# Patient Record
Sex: MALE | Race: WHITE | NOT HISPANIC OR LATINO | Employment: UNEMPLOYED | RURAL
[De-identification: names, ages, dates, MRNs, and addresses within clinical notes are randomized per-mention and may not be internally consistent; named-entity substitution may affect disease eponyms.]

---

## 2017-05-27 ENCOUNTER — CONVERSION ENCOUNTER (OUTPATIENT)
Dept: FAMILY MEDICINE CLINIC | Facility: CLINIC | Age: 52
End: 2017-05-27

## 2017-05-27 LAB
ALBUMIN SERPL-MCNC: 4.2 G/DL (ref 3.6–5.1)
ALBUMIN/GLOB SERPL: ABNORMAL {RATIO} (ref 1–2.5)
ALP SERPL-CCNC: 72 UNITS/L (ref 40–115)
ALT SERPL-CCNC: 19 UNITS/L (ref 9–46)
AST SERPL-CCNC: 24 UNITS/L (ref 10–35)
BILIRUB SERPL-MCNC: 0.5 MG/DL (ref 0.2–1.2)
BUN SERPL-MCNC: 5 MG/DL (ref 7–25)
BUN/CREAT SERPL: ABNORMAL (ref 6–22)
CALCIUM SERPL-MCNC: 10.4 MG/DL (ref 8.6–10.3)
CHLORIDE SERPL-SCNC: 101 MMOL/L (ref 98–110)
CHOLEST SERPL-MCNC: 190 MG/DL (ref 125–200)
CHOLEST/HDLC SERPL: ABNORMAL {RATIO}
CO2 CONTENT VENOUS: 27 MMOL/L (ref 20–31)
CONV TOTAL PROTEIN: 7.1 G/DL (ref 6.1–8.1)
CREAT UR-MCNC: 0.68 MG/DL (ref 0.7–1.33)
GLOBULIN UR ELPH-MCNC: ABNORMAL G/DL (ref 1.9–3.7)
GLUCOSE SERPL-MCNC: 86 MG/DL (ref 65–99)
HDLC SERPL-MCNC: 91 MG/DL
LDLC SERPL CALC-MCNC: ABNORMAL MG/DL
POTASSIUM SERPL-SCNC: 4.1 MMOL/L (ref 3.5–5.3)
PSA SERPL-MCNC: 0.7 NG/ML
SODIUM SERPL-SCNC: 136 MMOL/L (ref 135–146)
TRIGL SERPL-MCNC: 121 MG/DL
TSH SERPL-ACNC: 1.49 MICROINTL UNITS/ML (ref 0.4–4.5)

## 2017-09-25 ENCOUNTER — OFFICE (AMBULATORY)
Dept: URBAN - METROPOLITAN AREA CLINIC 64 | Facility: CLINIC | Age: 52
End: 2017-09-25

## 2017-09-25 ENCOUNTER — ON CAMPUS - OUTPATIENT (AMBULATORY)
Dept: URBAN - METROPOLITAN AREA HOSPITAL 2 | Facility: HOSPITAL | Age: 52
End: 2017-09-25

## 2017-09-25 VITALS
DIASTOLIC BLOOD PRESSURE: 69 MMHG | OXYGEN SATURATION: 97 % | SYSTOLIC BLOOD PRESSURE: 116 MMHG | TEMPERATURE: 98.8 F | SYSTOLIC BLOOD PRESSURE: 111 MMHG | HEART RATE: 94 BPM | HEART RATE: 79 BPM | SYSTOLIC BLOOD PRESSURE: 155 MMHG | SYSTOLIC BLOOD PRESSURE: 110 MMHG | DIASTOLIC BLOOD PRESSURE: 54 MMHG | OXYGEN SATURATION: 95 % | HEART RATE: 98 BPM | RESPIRATION RATE: 14 BRPM | SYSTOLIC BLOOD PRESSURE: 108 MMHG | SYSTOLIC BLOOD PRESSURE: 102 MMHG | SYSTOLIC BLOOD PRESSURE: 101 MMHG | OXYGEN SATURATION: 98 % | HEART RATE: 82 BPM | HEART RATE: 77 BPM | DIASTOLIC BLOOD PRESSURE: 67 MMHG | DIASTOLIC BLOOD PRESSURE: 71 MMHG | RESPIRATION RATE: 19 BRPM | HEART RATE: 95 BPM | HEART RATE: 73 BPM | DIASTOLIC BLOOD PRESSURE: 99 MMHG | WEIGHT: 145 LBS | SYSTOLIC BLOOD PRESSURE: 135 MMHG | RESPIRATION RATE: 16 BRPM | DIASTOLIC BLOOD PRESSURE: 73 MMHG | RESPIRATION RATE: 15 BRPM | DIASTOLIC BLOOD PRESSURE: 72 MMHG | HEIGHT: 68 IN | HEART RATE: 93 BPM | DIASTOLIC BLOOD PRESSURE: 62 MMHG | HEART RATE: 96 BPM | DIASTOLIC BLOOD PRESSURE: 93 MMHG | OXYGEN SATURATION: 99 % | SYSTOLIC BLOOD PRESSURE: 151 MMHG

## 2017-09-25 DIAGNOSIS — Z12.11 ENCOUNTER FOR SCREENING FOR MALIGNANT NEOPLASM OF COLON: ICD-10-CM

## 2017-09-25 DIAGNOSIS — Z80.0 FAMILY HISTORY OF MALIGNANT NEOPLASM OF DIGESTIVE ORGANS: ICD-10-CM

## 2017-09-25 DIAGNOSIS — D12.5 BENIGN NEOPLASM OF SIGMOID COLON: ICD-10-CM

## 2017-09-25 LAB
GI HISTOLOGY: A. UNSPECIFIED: (no result)
GI HISTOLOGY: PDF REPORT: (no result)

## 2017-09-25 PROCEDURE — 88305 TISSUE EXAM BY PATHOLOGIST: CPT | Performed by: INTERNAL MEDICINE

## 2017-09-25 PROCEDURE — 45385 COLONOSCOPY W/LESION REMOVAL: CPT | Mod: 33 | Performed by: INTERNAL MEDICINE

## 2017-09-25 RX ADMIN — PROPOFOL: 10 INJECTION, EMULSION INTRAVENOUS at 09:07

## 2017-09-25 NOTE — SERVICEHPINOTES
DAVID SCANLON  is a  52  male   who presents today for a  Colonoscopy   for   the indications listed below. The updated Patient Profile was reviewed prior to the procedure, in conjunction with the Physical Exam, including medical conditions, surgical procedures, medications, allergies, family history and social history. See Physical Exam time stamp below for date and time of HPI completion.Pre-operatively, I reviewed the indication(s) for the procedure, the risks of the procedure [including but not limited to: unexpected bleeding possibly requiring hospitalization and/or unplanned repeat procedures, perforation possibly requiring surgical treatment, missed lesions and complications of sedation/MAC (also explained by anesthesia staff)]. I have evaluated the patient for risks associated with the planned anesthesia and the procedure to be performed and find the patient an acceptable candidate for IV sedation.Multiple opportunities were provided for any questions or concerns, and all questions were answered satisfactorily before any anesthesia was administered. We will proceed with the planned procedure.

## 2017-10-02 ENCOUNTER — HOSPITAL ENCOUNTER (OUTPATIENT)
Dept: OTHER | Facility: HOSPITAL | Age: 52
Discharge: HOME | End: 2017-10-02
Attending: FAMILY MEDICINE | Admitting: FAMILY MEDICINE

## 2018-10-18 LAB
ALBUMIN SERPL-MCNC: 4.3 G/DL (ref 3.6–5.1)
ALBUMIN/GLOB SERPL: ABNORMAL {RATIO} (ref 1–2.5)
ALP SERPL-CCNC: 94 UNITS/L (ref 40–115)
ALT SERPL-CCNC: 34 UNITS/L (ref 9–46)
AST SERPL-CCNC: 49 UNITS/L (ref 10–35)
BASOPHILS # BLD AUTO: ABNORMAL 10*3/MM3 (ref 0–200)
BASOPHILS NFR BLD AUTO: 1.2 %
BILIRUB SERPL-MCNC: 0.7 MG/DL (ref 0.2–1.2)
BUN SERPL-MCNC: 5 MG/DL (ref 7–25)
BUN/CREAT SERPL: ABNORMAL (ref 6–22)
CALCIUM SERPL-MCNC: 11.1 MG/DL (ref 8.6–10.3)
CHLORIDE SERPL-SCNC: 101 MMOL/L (ref 98–110)
CHOLEST SERPL-MCNC: 223 MG/DL
CHOLEST/HDLC SERPL: ABNORMAL {RATIO}
CO2 CONTENT VENOUS: 25 MMOL/L (ref 20–32)
CONV NEUTROPHILS/100 LEUKOCYTES IN BODY FLUID BY MANUAL COUNT: 37.6 %
CONV TOTAL PROTEIN: 7.6 G/DL (ref 6.1–8.1)
CREAT UR-MCNC: 0.77 MG/DL (ref 0.7–1.33)
EOSINOPHIL # BLD AUTO: 7.8 %
EOSINOPHIL # BLD AUTO: ABNORMAL 10*3/MM3 (ref 15–500)
ERYTHROCYTE [DISTWIDTH] IN BLOOD BY AUTOMATED COUNT: 11.7 % (ref 11–15)
GLOBULIN UR ELPH-MCNC: ABNORMAL G/DL (ref 1.9–3.7)
GLUCOSE SERPL-MCNC: 90 MG/DL (ref 65–99)
HCT VFR BLD AUTO: 43.9 % (ref 38.5–50)
HDLC SERPL-MCNC: 119 MG/DL
HGB BLD-MCNC: 15.4 G/DL (ref 13.2–17.1)
LDLC SERPL CALC-MCNC: ABNORMAL MG/DL
LYMPHOCYTES # BLD AUTO: ABNORMAL 10*3/MM3 (ref 850–3900)
LYMPHOCYTES NFR BLD AUTO: 37.6 %
MCH RBC QN AUTO: 31.9 PG (ref 27–33)
MCHC RBC AUTO-ENTMCNC: ABNORMAL % (ref 32–36)
MCV RBC AUTO: 90.9 FL (ref 80–100)
MONOCYTES # BLD AUTO: ABNORMAL 10*3/MICROLITER (ref 200–950)
MONOCYTES NFR BLD AUTO: 15.8 %
NEUTROPHILS # BLD AUTO: ABNORMAL 10*3/MM3 (ref 1500–7800)
PLATELET # BLD AUTO: ABNORMAL 10*3/MM3 (ref 140–400)
PMV BLD AUTO: 10.2 FL (ref 7.5–12.5)
POTASSIUM SERPL-SCNC: 4.3 MMOL/L (ref 3.5–5.3)
PSA SERPL-MCNC: 1.1 NG/ML
RBC # BLD AUTO: ABNORMAL 10*6/MM3 (ref 4.2–5.8)
SODIUM SERPL-SCNC: 136 MMOL/L (ref 135–146)
TRIGL SERPL-MCNC: 66 MG/DL
WBC # BLD AUTO: ABNORMAL K/UL (ref 3.8–10.8)

## 2019-04-18 LAB
ALBUMIN SERPL-MCNC: 3.9 G/DL (ref 3.6–5.1)
ALBUMIN/GLOB SERPL: ABNORMAL {RATIO} (ref 1–2.5)
ALP SERPL-CCNC: 102 UNITS/L (ref 40–115)
ALT SERPL-CCNC: 66 UNITS/L (ref 9–46)
AST SERPL-CCNC: 79 UNITS/L (ref 10–35)
BILIRUB SERPL-MCNC: 0.6 MG/DL (ref 0.2–1.2)
BUN SERPL-MCNC: 4 MG/DL (ref 7–25)
BUN/CREAT SERPL: ABNORMAL (ref 6–22)
CALCIUM SERPL-MCNC: 10 MG/DL (ref 8.6–10.3)
CHLORIDE SERPL-SCNC: 100 MMOL/L (ref 98–110)
CO2 CONTENT VENOUS: 27 MMOL/L (ref 20–32)
CONV TOTAL PROTEIN: 6.8 G/DL (ref 6.1–8.1)
CREAT UR-MCNC: 0.71 MG/DL (ref 0.7–1.33)
GLOBULIN UR ELPH-MCNC: ABNORMAL G/DL (ref 1.9–3.7)
GLUCOSE SERPL-MCNC: 124 MG/DL (ref 65–99)
POTASSIUM SERPL-SCNC: 3.6 MMOL/L (ref 3.5–5.3)
SODIUM SERPL-SCNC: 138 MMOL/L (ref 135–146)

## 2019-05-23 ENCOUNTER — CONVERSION ENCOUNTER (OUTPATIENT)
Dept: FAMILY MEDICINE CLINIC | Facility: CLINIC | Age: 54
End: 2019-05-23

## 2019-05-23 LAB
ALBUMIN SERPL-MCNC: 3.9 G/DL (ref 3.6–5.1)
ALBUMIN/GLOB SERPL: ABNORMAL {RATIO} (ref 1–2.5)
ALP SERPL-CCNC: 94 UNITS/L (ref 40–115)
ALT SERPL-CCNC: 58 UNITS/L (ref 9–46)
AST SERPL-CCNC: 74 UNITS/L (ref 10–35)
BILIRUB SERPL-MCNC: 0.8 MG/DL (ref 0.2–1.2)
BUN SERPL-MCNC: 5 MG/DL (ref 7–25)
BUN/CREAT SERPL: ABNORMAL (ref 6–22)
CALCIUM SERPL-MCNC: 10.4 MG/DL (ref 8.6–10.3)
CHLORIDE SERPL-SCNC: 102 MMOL/L (ref 98–110)
CO2 CONTENT VENOUS: 27 MMOL/L (ref 20–32)
CONV TOTAL PROTEIN: 6.9 G/DL (ref 6.1–8.1)
CREAT UR-MCNC: 0.66 MG/DL (ref 0.7–1.33)
GLOBULIN UR ELPH-MCNC: ABNORMAL G/DL (ref 1.9–3.7)
GLUCOSE SERPL-MCNC: 78 MG/DL (ref 65–99)
POTASSIUM SERPL-SCNC: 4.1 MMOL/L (ref 3.5–5.3)
SODIUM SERPL-SCNC: 138 MMOL/L (ref 135–146)

## 2019-07-31 RX ORDER — AMLODIPINE BESYLATE 10 MG/1
TABLET ORAL
Qty: 90 TABLET | Refills: 3 | Status: SHIPPED | OUTPATIENT
Start: 2019-07-31 | End: 2020-08-20

## 2019-08-29 ENCOUNTER — HOSPITAL ENCOUNTER (OUTPATIENT)
Dept: GENERAL RADIOLOGY | Facility: HOSPITAL | Age: 54
Discharge: HOME | End: 2019-08-29
Admitting: FAMILY MEDICINE

## 2019-08-29 ENCOUNTER — OFFICE VISIT (OUTPATIENT)
Dept: FAMILY MEDICINE CLINIC | Facility: CLINIC | Age: 54
End: 2019-08-29

## 2019-08-29 VITALS
BODY MASS INDEX: 21.07 KG/M2 | TEMPERATURE: 98.5 F | RESPIRATION RATE: 16 BRPM | WEIGHT: 139 LBS | HEIGHT: 68 IN | SYSTOLIC BLOOD PRESSURE: 108 MMHG | HEART RATE: 80 BPM | DIASTOLIC BLOOD PRESSURE: 73 MMHG | OXYGEN SATURATION: 98 %

## 2019-08-29 DIAGNOSIS — R19.7 VOMITING AND DIARRHEA: ICD-10-CM

## 2019-08-29 DIAGNOSIS — R11.10 VOMITING AND DIARRHEA: ICD-10-CM

## 2019-08-29 DIAGNOSIS — R19.7 VOMITING AND DIARRHEA: Primary | ICD-10-CM

## 2019-08-29 DIAGNOSIS — R91.1 PULMONARY NODULE: ICD-10-CM

## 2019-08-29 DIAGNOSIS — R11.10 VOMITING AND DIARRHEA: Primary | ICD-10-CM

## 2019-08-29 PROBLEM — M54.9 BACK PAIN: Status: ACTIVE | Noted: 2017-07-07

## 2019-08-29 PROBLEM — R74.8 ELEVATED LIVER ENZYMES: Status: ACTIVE | Noted: 2019-04-17

## 2019-08-29 PROBLEM — D12.6 ADENOMATOUS POLYP OF COLON: Status: ACTIVE | Noted: 2017-09-26

## 2019-08-29 PROBLEM — K43.9 HERNIA OF ANTERIOR ABDOMINAL WALL: Status: ACTIVE | Noted: 2017-07-07

## 2019-08-29 PROBLEM — B35.3 TINEA PEDIS: Status: ACTIVE | Noted: 2018-10-18

## 2019-08-29 PROBLEM — L03.213 PERIORBITAL CELLULITIS OF LEFT EYE: Status: ACTIVE | Noted: 2018-11-28

## 2019-08-29 PROBLEM — R07.89 CHEST WALL PAIN: Status: ACTIVE | Noted: 2017-10-02

## 2019-08-29 PROCEDURE — 99213 OFFICE O/P EST LOW 20 MIN: CPT | Performed by: FAMILY MEDICINE

## 2019-08-29 PROCEDURE — 74022 RADEX COMPL AQT ABD SERIES: CPT

## 2019-08-29 RX ORDER — TERBINAFINE HYDROCHLORIDE 250 MG/1
250 TABLET ORAL DAILY
COMMUNITY
Start: 2018-10-18 | End: 2021-08-11

## 2019-08-29 RX ORDER — NAFTIFINE HYDROCHLORIDE 20 MG/G
CREAM TOPICAL
COMMUNITY
Start: 2018-10-18 | End: 2021-08-11

## 2019-08-29 RX ORDER — ONDANSETRON 4 MG/1
TABLET, FILM COATED ORAL
Refills: 0 | COMMUNITY
Start: 2019-08-27 | End: 2019-09-05 | Stop reason: SDUPTHER

## 2019-08-29 NOTE — PROGRESS NOTES
He states he is able to keep down some liquids but has persistent symptoms for the last 5 days.      Vomiting    This is a new problem. The current episode started in the past 7 days. The problem occurs 5 to 10 times per day. The problem has been unchanged. The emesis has an appearance of bile and stomach contents. There has been no fever. Associated symptoms include abdominal pain, chills, diarrhea, headaches, myalgias and sweats. Pertinent negatives include no arthralgias, chest pain, coughing, dizziness, fever or URI. He has tried sleep and increased fluids for the symptoms. The treatment provided no relief.     Past Medical History:   Diagnosis Date   • Anxiety    • Asthma    • Elevated liver enzymes    • Family history of malignant neoplasm of gastrointestinal tract    • Goiter, non-toxic    • Hernia of anterior abdominal wall    • Hx of adenomatous colonic polyps    • Hyperlipidemia    • Hypertension    • Impotence, organic    • Mitral valve insufficiency    • Osteoporosis    • Periorbital cellulitis    • Primary hyperparathyroidism (CMS/HCC)    • Pulmonary nodules    • Renal cyst    • Ventral hernia    • Vitamin D deficiency      Past Surgical History:   Procedure Laterality Date   • COLONOSCOPY     • OTHER SURGICAL HISTORY      right talus fracture repair   • OTHER SURGICAL HISTORY      l thyroid nodule biopsy   • OTHER SURGICAL HISTORY      left thyroid lobectomy , bilateral parathyroid exploration      Family History   Problem Relation Age of Onset   • Breast cancer Mother    • Colon cancer Father    • Hypertension Paternal Grandfather      Social History     Tobacco Use   • Smoking status: Never Smoker   • Smokeless tobacco: Never Used   Substance Use Topics   • Alcohol use: No     Frequency: Never     PHQ-2 Depression Screening  Little interest or pleasure in doing things? 0   Feeling down, depressed, or hopeless? 0   PHQ-2 Total Score 0     PHQ-9 Depression Screening  Little interest or pleasure in  "doing things? 0   Feeling down, depressed, or hopeless? 0   Trouble falling or staying asleep, or sleeping too much?     Feeling tired or having little energy?     Poor appetite or overeating?     Feeling bad about yourself - or that you are a failure or have let yourself or your family down?     Trouble concentrating on things, such as reading the newspaper or watching television?     Moving or speaking so slowly that other people could have noticed? Or the opposite - being so fidgety or restless that you have been moving around a lot more than usual?     Thoughts that you would be better off dead, or of hurting yourself in some way?     PHQ-9 Total Score 0   If you checked off any problems, how difficult have these problems made it for you to do your work, take care of things at home, or get along with other people?         Review of Systems   Constitutional: Positive for chills. Negative for fatigue and fever.   Respiratory: Negative for cough and shortness of breath.    Cardiovascular: Negative for chest pain and palpitations.   Gastrointestinal: Positive for abdominal pain, diarrhea, nausea, vomiting, GERD and indigestion.   Genitourinary: Positive for decreased urine volume.   Musculoskeletal: Positive for myalgias. Negative for arthralgias and back pain.   Skin: Negative for rash.   Neurological: Negative for dizziness and headache.     Vitals:    08/29/19 0911   BP: 108/73   BP Location: Right arm   Patient Position: Sitting   Cuff Size: Adult   Pulse: 80   Resp: 16   Temp: 98.5 °F (36.9 °C)   TempSrc: Oral   SpO2: 98%   Weight: 63 kg (139 lb)   Height: 172.7 cm (68\")     Body mass index is 21.13 kg/m².  Physical Exam   Constitutional: He is oriented to person, place, and time. He appears well-developed and well-nourished. No distress.   Cardiovascular: Normal rate, regular rhythm and normal heart sounds.   No murmur heard.  Pulmonary/Chest: Effort normal and breath sounds normal. He has no wheezes. He has " no rales.   Abdominal: Soft. Bowel sounds are normal. He exhibits no distension.   Musculoskeletal: Normal range of motion.   Neurological: He is alert and oriented to person, place, and time.   Skin: Skin is warm and dry.   Psychiatric: He has a normal mood and affect. His behavior is normal.     No visits with results within 7 Day(s) from this visit.   Latest known visit with results is:   Conversion Encounter on 05/23/2019   Component Date Value Ref Range Status   • Albumin 05/23/2019 3.9  3.6 - 5.1 g/dL Final   • Alkaline Phosphatase 05/23/2019 94  40 - 115 units/L Final   • Total Bilirubin 05/23/2019 0.8  0.2 - 1.2 mg/dL Final   • BUN 05/23/2019 5* 7 - 25 mg/dL Final   • BUN/Creatinine Ratio 05/23/2019 8 (calc)  6 - 22 Final   • Calcium 05/23/2019 10.4* 8.6 - 10.3 mg/dL Final   • Chloride 05/23/2019 102  98 - 110 mmol/L Final   • CO2 CONTENT  05/23/2019 27  20 - 32 mmol/L Final   • Creatinine 05/23/2019 0.66* 0.70 - 1.33 mg/dL Final   • eGFR African Am 05/23/2019 109  > OR = 60 mL/min/1.73m2 Final   • eGFR Non  Am 05/23/2019 126  > OR = 60 mL/min/1.73m2 Final   • Globulin 05/23/2019 3.0 G/DL (CALC)  1.9 - 3.7 g/dL Final   • Glucose 05/23/2019 78  65 - 99 mg/dL Final   • Potassium 05/23/2019 4.1  3.5 - 5.3 mmol/L Final   • Total Protein 05/23/2019 6.9  6.1 - 8.1 g/dL Final   • AST (SGOT) 05/23/2019 74* 10 - 35 units/L Final   • ALT (SGPT) 05/23/2019 58* 9 - 46 units/L Final   • Sodium 05/23/2019 138  135 - 146 mmol/L Final   • A/G Ratio 05/23/2019 1.3 (calc)  1.0 - 2.5 Final         Diagnoses and all orders for this visit:    1. Vomiting and diarrhea (Primary)  Assessment & Plan:  Most likely from viral gastroneuritis of the could be from food poisoning.  His wife now has similar symptoms.  He has not had a fever.  He denies blood or mucus in stools.  We will continue force fluids and continue ondansetron as needed.  If feeling symptoms in the next 3 days we will check blood work and repeat imaging.  He  does have a non-specific bowel gas pattern noted on x-ray. Increase fluids. Avoid milk products and caffeine. Clear liquids until better.       Orders:  -     XR Abdomen 2 View With Chest 1 View; Future    2. Pulmonary nodule  Assessment & Plan:  Noted on x-ray.  We will check a CT of the chest

## 2019-08-29 NOTE — ASSESSMENT & PLAN NOTE
Most likely from viral gastroneuritis of the could be from food poisoning.  His wife now has similar symptoms.  He has not had a fever.  He denies blood or mucus in stools.  We will continue force fluids and continue ondansetron as needed.  If feeling symptoms in the next 3 days we will check blood work and repeat imaging.  He does have a non-specific bowel gas pattern noted on x-ray. Increase fluids. Avoid milk products and caffeine. Clear liquids until better.

## 2019-08-30 ENCOUNTER — TELEPHONE (OUTPATIENT)
Dept: FAMILY MEDICINE CLINIC | Facility: CLINIC | Age: 54
End: 2019-08-30

## 2019-08-30 DIAGNOSIS — R91.1 PULMONARY NODULE: Primary | ICD-10-CM

## 2019-08-30 NOTE — TELEPHONE ENCOUNTER
----- Message from Reggie Duane Lyell, MD sent at 8/29/2019  1:41 PM EDT -----  Please set up for CT chest with contrast to evaluate pulmonary nodule

## 2019-09-05 ENCOUNTER — TELEPHONE (OUTPATIENT)
Dept: FAMILY MEDICINE CLINIC | Facility: CLINIC | Age: 54
End: 2019-09-05

## 2019-09-05 RX ORDER — ONDANSETRON 4 MG/1
4 TABLET, FILM COATED ORAL EVERY 8 HOURS PRN
Qty: 30 TABLET | Refills: 0 | Status: SHIPPED | OUTPATIENT
Start: 2019-09-05 | End: 2019-10-17 | Stop reason: SDUPTHER

## 2019-09-05 NOTE — TELEPHONE ENCOUNTER
The patient is still sick and you said that you offered him zofran when he was in here but he didn't take it.  He wants to know if you will send that in to [facility] for him. Please advise

## 2019-09-13 ENCOUNTER — HOSPITAL ENCOUNTER (OUTPATIENT)
Dept: CT IMAGING | Facility: HOSPITAL | Age: 54
Discharge: HOME | End: 2019-09-13
Admitting: FAMILY MEDICINE

## 2019-09-13 DIAGNOSIS — R91.1 PULMONARY NODULE: ICD-10-CM

## 2019-09-13 LAB — CREAT BLDA-MCNC: 0.6 MG/DL (ref 0.6–1.3)

## 2019-09-13 PROCEDURE — 82565 ASSAY OF CREATININE: CPT

## 2019-09-13 PROCEDURE — 0 IOPAMIDOL PER 1 ML: Performed by: FAMILY MEDICINE

## 2019-09-13 PROCEDURE — 71260 CT THORAX DX C+: CPT

## 2019-09-13 RX ADMIN — IOPAMIDOL 100 ML: 755 INJECTION, SOLUTION INTRAVENOUS at 17:15

## 2019-10-14 DIAGNOSIS — R06.02 SHORTNESS OF BREATH: Primary | ICD-10-CM

## 2019-10-17 ENCOUNTER — OFFICE VISIT (OUTPATIENT)
Dept: FAMILY MEDICINE CLINIC | Facility: CLINIC | Age: 54
End: 2019-10-17

## 2019-10-17 VITALS
BODY MASS INDEX: 21.19 KG/M2 | WEIGHT: 139.8 LBS | RESPIRATION RATE: 18 BRPM | OXYGEN SATURATION: 98 % | HEART RATE: 77 BPM | TEMPERATURE: 97.9 F | HEIGHT: 68 IN | DIASTOLIC BLOOD PRESSURE: 69 MMHG | SYSTOLIC BLOOD PRESSURE: 109 MMHG

## 2019-10-17 DIAGNOSIS — Z23 NEED FOR VACCINATION FOR H FLU TYPE B: ICD-10-CM

## 2019-10-17 DIAGNOSIS — R11.10 VOMITING AND DIARRHEA: Primary | ICD-10-CM

## 2019-10-17 DIAGNOSIS — R19.7 VOMITING AND DIARRHEA: Primary | ICD-10-CM

## 2019-10-17 DIAGNOSIS — R10.84 GENERALIZED ABDOMINAL PAIN: ICD-10-CM

## 2019-10-17 PROCEDURE — 99213 OFFICE O/P EST LOW 20 MIN: CPT | Performed by: FAMILY MEDICINE

## 2019-10-17 PROCEDURE — 90686 IIV4 VACC NO PRSV 0.5 ML IM: CPT | Performed by: FAMILY MEDICINE

## 2019-10-17 PROCEDURE — 90471 IMMUNIZATION ADMIN: CPT | Performed by: FAMILY MEDICINE

## 2019-10-17 RX ORDER — ONDANSETRON 4 MG/1
4 TABLET, FILM COATED ORAL EVERY 8 HOURS PRN
Qty: 30 TABLET | Refills: 0 | Status: SHIPPED | OUTPATIENT
Start: 2019-10-17 | End: 2021-08-11

## 2019-10-17 NOTE — PROGRESS NOTES
Patient returns for follow-up of continued abdominal pain associated with intermittent diarrhea.  He had a colonoscopy 1 year ago.  This was unremarkable.  He had been seen in the emergency room with imaging studies none.  He has been treated empirically with no significant improvement.  He continues to have nausea and lower abdominal pain.  Caffeine and alcohol seem to exacerbate symptoms.      Past Medical History:   Diagnosis Date   • Anxiety    • Asthma    • Elevated liver enzymes    • Family history of malignant neoplasm of gastrointestinal tract    • Goiter, non-toxic    • Hernia of anterior abdominal wall    • Hx of adenomatous colonic polyps    • Hyperlipidemia    • Hypertension    • Impotence, organic    • Mitral valve insufficiency    • Osteoporosis    • Periorbital cellulitis    • Primary hyperparathyroidism (CMS/HCC)    • Pulmonary nodules    • Renal cyst    • Ventral hernia    • Vitamin D deficiency      Past Surgical History:   Procedure Laterality Date   • COLONOSCOPY     • OTHER SURGICAL HISTORY      right talus fracture repair   • OTHER SURGICAL HISTORY      l thyroid nodule biopsy   • OTHER SURGICAL HISTORY      left thyroid lobectomy , bilateral parathyroid exploration      Family History   Problem Relation Age of Onset   • Breast cancer Mother    • Colon cancer Father    • Hypertension Paternal Grandfather      Social History     Tobacco Use   • Smoking status: Never Smoker   • Smokeless tobacco: Never Used   Substance Use Topics   • Alcohol use: No     Frequency: Never     Review of Systems   Constitutional: Negative for chills and fatigue.   Respiratory: Negative for cough and shortness of breath.    Cardiovascular: Negative for chest pain and palpitations.   Gastrointestinal:        See HPI   Musculoskeletal: Negative for arthralgias, back pain and myalgias.   Skin: Negative for rash.   Neurological: Negative for dizziness and headache.     Vitals:    10/17/19 1305   BP: 109/69   BP Location:  "Left arm   Cuff Size: Adult   Pulse: 77   Resp: 18   Temp: 97.9 °F (36.6 °C)   TempSrc: Oral   SpO2: 98%   Weight: 63.4 kg (139 lb 12.8 oz)   Height: 172.7 cm (68\")     Body mass index is 21.26 kg/m².  Physical Exam   Constitutional: He is oriented to person, place, and time. He appears well-developed and well-nourished. No distress.   Cardiovascular: Normal rate, regular rhythm and normal heart sounds.   No murmur heard.  Pulmonary/Chest: Effort normal and breath sounds normal. He has no wheezes. He has no rales.   Abdominal: Soft. Bowel sounds are normal. He exhibits no distension.   Musculoskeletal: Normal range of motion.   Neurological: He is alert and oriented to person, place, and time.   Skin: Skin is warm and dry.   Psychiatric: He has a normal mood and affect. His behavior is normal.     No visits with results within 7 Day(s) from this visit.   Latest known visit with results is:   Hospital Outpatient Visit on 09/13/2019   Component Date Value Ref Range Status   • Creatinine 09/13/2019 0.60  0.60 - 1.30 mg/dL Final    Serial Number: [id number]:  [id number]   • GFR MDRD  09/13/2019    Final    Serial Number: [id number]:  [id number]   • GFR MDRD Non  09/13/2019    Final    Serial Number: [id number]:  [id number]         Diagnoses and all orders for this visit:    1. Vomiting and diarrhea (Primary)  Assessment & Plan:  Uncertain etiology.  Will get a gastrology consult to see if we are dealing with a functional issue, infectious issue, or something else.  He has had normal blood work and stool studies.  Going GI recognitions. Will increase Lomotil to every 6 hours as needed.    Orders:  -     Ambulatory Referral to Gastroenterology    2. Generalized abdominal pain  -     Ambulatory Referral to Gastroenterology    3. Need for vaccination for H flu type B  -     Fluarix/Fluzone/Afluria Quad>6 Months    Other orders  -     ondansetron (ZOFRAN) 4 MG tablet; Take 1 tablet by mouth " Every 8 (Eight) Hours As Needed for Nausea or Vomiting.  Dispense: 30 tablet; Refill: 0

## 2019-10-18 NOTE — ASSESSMENT & PLAN NOTE
Uncertain etiology.  Will get a gastrology consult to see if we are dealing with a functional issue, infectious issue, or something else.  He has had normal blood work and stool studies.  Going GI recognitions. Will increase Lomotil to every 6 hours as needed.

## 2019-11-25 ENCOUNTER — OFFICE (AMBULATORY)
Dept: URBAN - METROPOLITAN AREA CLINIC 64 | Facility: CLINIC | Age: 54
End: 2019-11-25

## 2019-11-25 VITALS
HEIGHT: 68 IN | HEART RATE: 102 BPM | SYSTOLIC BLOOD PRESSURE: 124 MMHG | WEIGHT: 140 LBS | DIASTOLIC BLOOD PRESSURE: 77 MMHG

## 2019-11-25 DIAGNOSIS — R19.5 OTHER FECAL ABNORMALITIES: ICD-10-CM

## 2019-11-25 DIAGNOSIS — K21.9 GASTRO-ESOPHAGEAL REFLUX DISEASE WITHOUT ESOPHAGITIS: ICD-10-CM

## 2019-11-25 DIAGNOSIS — I10 ESSENTIAL (PRIMARY) HYPERTENSION: ICD-10-CM

## 2019-11-25 DIAGNOSIS — F41.9 ANXIETY DISORDER, UNSPECIFIED: ICD-10-CM

## 2019-11-25 DIAGNOSIS — R63.4 ABNORMAL WEIGHT LOSS: ICD-10-CM

## 2019-11-25 DIAGNOSIS — R10.33 PERIUMBILICAL PAIN: ICD-10-CM

## 2019-11-25 DIAGNOSIS — R19.7 DIARRHEA, UNSPECIFIED: ICD-10-CM

## 2019-11-25 DIAGNOSIS — R15.2 FECAL URGENCY: ICD-10-CM

## 2019-11-25 DIAGNOSIS — R11.2 NAUSEA WITH VOMITING, UNSPECIFIED: ICD-10-CM

## 2019-11-25 PROCEDURE — 99214 OFFICE O/P EST MOD 30 MIN: CPT | Performed by: NURSE PRACTITIONER

## 2019-11-25 RX ORDER — ONDANSETRON HYDROCHLORIDE 4 MG/1
16 TABLET, FILM COATED ORAL
Qty: 40 | Refills: 0 | Status: COMPLETED
Start: 2019-11-25 | End: 2021-06-09

## 2019-11-25 RX ORDER — OMEPRAZOLE 40 MG/1
40 CAPSULE, DELAYED RELEASE ORAL
Qty: 30 | Refills: 11 | Status: COMPLETED
Start: 2019-11-25 | End: 2020-01-27

## 2019-11-25 RX ORDER — DICYCLOMINE HYDROCHLORIDE 20 MG/1
80 TABLET ORAL
Qty: 120 | Refills: 12 | Status: ACTIVE
Start: 2019-11-25

## 2019-12-30 ENCOUNTER — ANESTHESIA EVENT (OUTPATIENT)
Dept: GASTROENTEROLOGY | Facility: HOSPITAL | Age: 54
End: 2019-12-30

## 2019-12-30 ENCOUNTER — TELEPHONE (OUTPATIENT)
Dept: FAMILY MEDICINE CLINIC | Facility: CLINIC | Age: 54
End: 2019-12-30

## 2019-12-30 DIAGNOSIS — R91.1 PULMONARY NODULE: Primary | ICD-10-CM

## 2019-12-30 NOTE — TELEPHONE ENCOUNTER
I did not have a tickler for this, I have ordered and notified patient that I would get it precerted through insurance and they will call him downstairs to schedule.

## 2019-12-30 NOTE — TELEPHONE ENCOUNTER
----- Message from Reggie Duane Lyell, MD sent at 12/30/2019  7:42 AM EST -----  Did we ever get this set up?  ----- Message -----  From: Lyell, Reggie Duane, MD  Sent: 9/16/2019   5:24 AM EST  To: Reggie Duane Lyell, MD    Repeat CT of chest

## 2019-12-31 ENCOUNTER — ON CAMPUS - OUTPATIENT (AMBULATORY)
Dept: URBAN - METROPOLITAN AREA HOSPITAL 85 | Facility: HOSPITAL | Age: 54
End: 2019-12-31

## 2019-12-31 ENCOUNTER — ANESTHESIA (OUTPATIENT)
Dept: GASTROENTEROLOGY | Facility: HOSPITAL | Age: 54
End: 2019-12-31

## 2019-12-31 ENCOUNTER — HOSPITAL ENCOUNTER (OUTPATIENT)
Facility: HOSPITAL | Age: 54
Setting detail: HOSPITAL OUTPATIENT SURGERY
Discharge: HOME | End: 2019-12-31
Attending: INTERNAL MEDICINE | Admitting: INTERNAL MEDICINE

## 2019-12-31 VITALS
DIASTOLIC BLOOD PRESSURE: 77 MMHG | HEIGHT: 68 IN | SYSTOLIC BLOOD PRESSURE: 107 MMHG | OXYGEN SATURATION: 100 % | HEART RATE: 71 BPM | RESPIRATION RATE: 11 BRPM | WEIGHT: 138 LBS | BODY MASS INDEX: 20.92 KG/M2

## 2019-12-31 DIAGNOSIS — R19.7 DIARRHEA, UNSPECIFIED: ICD-10-CM

## 2019-12-31 DIAGNOSIS — R10.33 PERIUMBILICAL PAIN: ICD-10-CM

## 2019-12-31 DIAGNOSIS — R11.2 NAUSEA WITH VOMITING, UNSPECIFIED: ICD-10-CM

## 2019-12-31 DIAGNOSIS — R19.5 ABNORMAL FECES: ICD-10-CM

## 2019-12-31 DIAGNOSIS — R19.7 DIARRHEA: ICD-10-CM

## 2019-12-31 DIAGNOSIS — R10.33 ABDOMINAL PAIN, PERIUMBILICAL: ICD-10-CM

## 2019-12-31 DIAGNOSIS — K21.0 GASTRO-ESOPHAGEAL REFLUX DISEASE WITH ESOPHAGITIS: ICD-10-CM

## 2019-12-31 DIAGNOSIS — R11.2 NAUSEA WITH VOMITING: ICD-10-CM

## 2019-12-31 DIAGNOSIS — K29.60 OTHER GASTRITIS WITHOUT BLEEDING: ICD-10-CM

## 2019-12-31 DIAGNOSIS — K21.9 GASTROESOPHAGEAL REFLUX DISEASE: ICD-10-CM

## 2019-12-31 DIAGNOSIS — R19.5 OTHER FECAL ABNORMALITIES: ICD-10-CM

## 2019-12-31 DIAGNOSIS — R15.2 FECAL URGENCY: ICD-10-CM

## 2019-12-31 PROCEDURE — 43239 EGD BIOPSY SINGLE/MULTIPLE: CPT | Performed by: INTERNAL MEDICINE

## 2019-12-31 PROCEDURE — 88342 IMHCHEM/IMCYTCHM 1ST ANTB: CPT | Performed by: INTERNAL MEDICINE

## 2019-12-31 PROCEDURE — 25010000002 PROPOFOL 10 MG/ML EMULSION: Performed by: ANESTHESIOLOGY

## 2019-12-31 PROCEDURE — 88305 TISSUE EXAM BY PATHOLOGIST: CPT | Performed by: INTERNAL MEDICINE

## 2019-12-31 RX ORDER — PROPOFOL 10 MG/ML
VIAL (ML) INTRAVENOUS AS NEEDED
Status: DISCONTINUED | OUTPATIENT
Start: 2019-12-31 | End: 2019-12-31 | Stop reason: SURG

## 2019-12-31 RX ORDER — SODIUM CHLORIDE 0.9 % (FLUSH) 0.9 %
10 SYRINGE (ML) INJECTION AS NEEDED
Status: DISCONTINUED | OUTPATIENT
Start: 2019-12-31 | End: 2019-12-31 | Stop reason: HOSPADM

## 2019-12-31 RX ORDER — ONDANSETRON 2 MG/ML
4 INJECTION INTRAMUSCULAR; INTRAVENOUS ONCE AS NEEDED
Status: DISCONTINUED | OUTPATIENT
Start: 2019-12-31 | End: 2019-12-31 | Stop reason: HOSPADM

## 2019-12-31 RX ORDER — SODIUM CHLORIDE 0.9 % (FLUSH) 0.9 %
10 SYRINGE (ML) INJECTION EVERY 12 HOURS SCHEDULED
Status: DISCONTINUED | OUTPATIENT
Start: 2019-12-31 | End: 2019-12-31 | Stop reason: HOSPADM

## 2019-12-31 RX ORDER — SODIUM CHLORIDE 0.9 % (FLUSH) 0.9 %
3 SYRINGE (ML) INJECTION EVERY 12 HOURS SCHEDULED
Status: DISCONTINUED | OUTPATIENT
Start: 2019-12-31 | End: 2019-12-31 | Stop reason: HOSPADM

## 2019-12-31 RX ORDER — SODIUM CHLORIDE 9 MG/ML
9 INJECTION, SOLUTION INTRAVENOUS CONTINUOUS PRN
Status: DISCONTINUED | OUTPATIENT
Start: 2019-12-31 | End: 2019-12-31 | Stop reason: HOSPADM

## 2019-12-31 RX ADMIN — PROPOFOL 50 MG: 10 INJECTION, EMULSION INTRAVENOUS at 10:20

## 2019-12-31 RX ADMIN — SODIUM CHLORIDE 9 ML/HR: 0.9 INJECTION, SOLUTION INTRAVENOUS at 09:32

## 2019-12-31 RX ADMIN — PROPOFOL 70 MG: 10 INJECTION, EMULSION INTRAVENOUS at 10:23

## 2019-12-31 RX ADMIN — PROPOFOL 100 MG: 10 INJECTION, EMULSION INTRAVENOUS at 10:17

## 2019-12-31 NOTE — ANESTHESIA POSTPROCEDURE EVALUATION
Patient: Abdias Curry    Procedure Summary     Date:  12/31/19 Room / Location:  [facility] ENDOSCOPY 4 / [facility] ENDOSCOPY    Anesthesia Start:  1015 Anesthesia Stop:  1027    Procedure:  ESOPHAGOGASTRODUODENOSCOPY  WITH BIOPSY X1 AREA (N/A ) Diagnosis:       Abdominal pain, periumbilical      Nausea with vomiting      Abnormal feces      Gastroesophageal reflux disease      Diarrhea      Fecal urgency      (ABDOMINAL PAIN, PERIUMBILIC, N/V, GERD, DARK STOOLS, DIARREHA, FECAL URGENCY)    Surgeon:  Bryson Stover MD Provider:  Walker Osuna MD    Anesthesia Type:  MAC ASA Status:  3          Anesthesia Type: MAC    Vitals  Vitals Value Taken Time   /73 12/31/2019 10:50 AM   Temp     Pulse 56 12/31/2019 10:51 AM   Resp     SpO2 100 % 12/31/2019 10:51 AM   Vitals shown include unvalidated device data.        Post Anesthesia Care and Evaluation    Patient location during evaluation: PACU  Patient participation: complete - patient participated  Level of consciousness: awake  Pain scale: See nurse's notes for pain score.  Pain management: adequate  Airway patency: patent  Anesthetic complications: No anesthetic complications  PONV Status: none  Cardiovascular status: acceptable  Respiratory status: acceptable  Hydration status: acceptable    Comments: Patient seen and examined postoperatively; vital signs stable; SpO2 greater than or equal to 90%; cardiopulmonary status stable; nausea/vomiting adequately controlled; pain adequately controlled; no apparent anesthesia complications; patient discharged from anesthesia care when discharge criteria were met

## 2020-01-02 LAB
LAB AP CASE REPORT: NORMAL
LAB AP CLINICAL INFORMATION: NORMAL
PATH REPORT.FINAL DX SPEC: NORMAL
PATH REPORT.GROSS SPEC: NORMAL

## 2020-01-13 DIAGNOSIS — R91.1 PULMONARY NODULE: ICD-10-CM

## 2020-01-27 ENCOUNTER — OFFICE (AMBULATORY)
Dept: URBAN - METROPOLITAN AREA CLINIC 64 | Facility: CLINIC | Age: 55
End: 2020-01-27

## 2020-01-27 VITALS
WEIGHT: 143 LBS | HEART RATE: 85 BPM | SYSTOLIC BLOOD PRESSURE: 128 MMHG | DIASTOLIC BLOOD PRESSURE: 85 MMHG | HEIGHT: 68 IN

## 2020-01-27 DIAGNOSIS — R14.3 FLATULENCE: ICD-10-CM

## 2020-01-27 DIAGNOSIS — N40.0 BENIGN PROSTATIC HYPERPLASIA WITHOUT LOWER URINARY TRACT SYM: ICD-10-CM

## 2020-01-27 DIAGNOSIS — R10.30 LOWER ABDOMINAL PAIN, UNSPECIFIED: ICD-10-CM

## 2020-01-27 DIAGNOSIS — K21.9 GASTRO-ESOPHAGEAL REFLUX DISEASE WITHOUT ESOPHAGITIS: ICD-10-CM

## 2020-01-27 DIAGNOSIS — R19.7 DIARRHEA, UNSPECIFIED: ICD-10-CM

## 2020-01-27 DIAGNOSIS — M54.5 LOW BACK PAIN: ICD-10-CM

## 2020-01-27 PROCEDURE — 99214 OFFICE O/P EST MOD 30 MIN: CPT | Performed by: NURSE PRACTITIONER

## 2020-01-27 RX ORDER — RIFAXIMIN 550 MG/1
1650 TABLET ORAL
Qty: 42 | Refills: 0 | Status: COMPLETED
Start: 2020-01-27 | End: 2021-06-09

## 2020-03-19 ENCOUNTER — TELEPHONE (OUTPATIENT)
Dept: FAMILY MEDICINE CLINIC | Facility: CLINIC | Age: 55
End: 2020-03-19

## 2020-03-19 NOTE — TELEPHONE ENCOUNTER
----- Message from Chel James MA sent at 12/17/2019  3:36 PM EST -----  Regarding: CT CHEST DUE  Patient is due for a CT Chest W/O contrast for Lung Nodules.

## 2020-07-07 ENCOUNTER — TELEPHONE (OUTPATIENT)
Dept: FAMILY MEDICINE CLINIC | Facility: CLINIC | Age: 55
End: 2020-07-07

## 2020-07-07 DIAGNOSIS — R91.1 PULMONARY NODULE: Primary | ICD-10-CM

## 2020-07-07 NOTE — TELEPHONE ENCOUNTER
----- Message from Katty Ramires MA sent at 3/19/2020  9:56 AM EDT -----  Regarding: FW: CT CHEST DUE  Due for testing.     ----- Message -----  From: Chel James MA  Sent: 12/17/2019   3:36 PM EDT  To: Anderson Pace [facility]  Subject: CT CHEST DUE                                     Patient is due for a CT Chest W/O contrast for Lung Nodules.

## 2020-07-15 ENCOUNTER — APPOINTMENT (OUTPATIENT)
Dept: CT IMAGING | Facility: HOSPITAL | Age: 55
End: 2020-07-15

## 2020-08-03 ENCOUNTER — HOSPITAL ENCOUNTER (OUTPATIENT)
Dept: CT IMAGING | Facility: HOSPITAL | Age: 55
Discharge: HOME | End: 2020-08-03
Admitting: FAMILY MEDICINE

## 2020-08-03 DIAGNOSIS — R91.1 PULMONARY NODULE: ICD-10-CM

## 2020-08-03 PROCEDURE — 71250 CT THORAX DX C-: CPT

## 2020-08-03 NOTE — PROGRESS NOTES
Let him know that the largest of his lung nodules has started to calcify, and is therefore most likely benign and needs no further workup. The other lesions are similar in size and would need another Ct in 1 year, but have not changed.

## 2020-08-20 RX ORDER — AMLODIPINE BESYLATE 10 MG/1
10 TABLET ORAL DAILY
Qty: 90 TABLET | Refills: 1 | Status: SHIPPED | OUTPATIENT
Start: 2020-08-20 | End: 2021-08-16 | Stop reason: SDUPTHER

## 2020-09-09 PROBLEM — D36.9 ADENOMATOUS POLYP: Status: ACTIVE | Noted: 2020-09-09

## 2020-12-29 ENCOUNTER — OFFICE (AMBULATORY)
Dept: URBAN - METROPOLITAN AREA CLINIC 64 | Facility: CLINIC | Age: 55
End: 2020-12-29

## 2020-12-29 VITALS
HEIGHT: 68 IN | SYSTOLIC BLOOD PRESSURE: 131 MMHG | DIASTOLIC BLOOD PRESSURE: 78 MMHG | HEART RATE: 85 BPM | WEIGHT: 138 LBS

## 2020-12-29 DIAGNOSIS — K58.9 IRRITABLE BOWEL SYNDROME WITHOUT DIARRHEA: ICD-10-CM

## 2020-12-29 DIAGNOSIS — R10.30 LOWER ABDOMINAL PAIN, UNSPECIFIED: ICD-10-CM

## 2020-12-29 PROCEDURE — 99214 OFFICE O/P EST MOD 30 MIN: CPT | Performed by: INTERNAL MEDICINE

## 2020-12-29 RX ORDER — AMITRIPTYLINE HYDROCHLORIDE 25 MG/1
25 TABLET, FILM COATED ORAL
Qty: 30 | Refills: 5 | Status: ACTIVE
Start: 2020-12-29

## 2020-12-29 RX ORDER — PEPPERMINT OIL 90 MG
180 CAPSULE, DELAYED, AND EXTENDED RELEASE ORAL
Qty: 48 | Refills: 1 | Status: COMPLETED
Start: 2020-12-29 | End: 2021-06-09

## 2021-06-09 ENCOUNTER — OFFICE (AMBULATORY)
Dept: URBAN - METROPOLITAN AREA CLINIC 64 | Facility: CLINIC | Age: 56
End: 2021-06-09

## 2021-06-09 VITALS
SYSTOLIC BLOOD PRESSURE: 111 MMHG | HEART RATE: 107 BPM | HEIGHT: 68 IN | WEIGHT: 130 LBS | DIASTOLIC BLOOD PRESSURE: 69 MMHG

## 2021-06-09 DIAGNOSIS — R11.2 NAUSEA WITH VOMITING, UNSPECIFIED: ICD-10-CM

## 2021-06-09 DIAGNOSIS — R10.33 PERIUMBILICAL PAIN: ICD-10-CM

## 2021-06-09 DIAGNOSIS — R19.7 DIARRHEA, UNSPECIFIED: ICD-10-CM

## 2021-06-09 PROCEDURE — 99214 OFFICE O/P EST MOD 30 MIN: CPT | Performed by: INTERNAL MEDICINE

## 2021-06-09 RX ORDER — NORTRIPTYLINE HYDROCHLORIDE 50 MG/1
50 CAPSULE ORAL
Qty: 30 | Refills: 5 | Status: ACTIVE
Start: 2021-06-09

## 2021-06-09 RX ORDER — ONDANSETRON 4 MG/1
12 TABLET, ORALLY DISINTEGRATING ORAL
Qty: 45 | Refills: 4 | Status: ACTIVE
Start: 2021-06-09

## 2021-06-10 RX ORDER — AMLODIPINE BESYLATE 5 MG/1
TABLET ORAL
Qty: 30 TABLET | Refills: 0 | Status: SHIPPED | OUTPATIENT
Start: 2021-06-10 | End: 2021-07-13

## 2021-06-15 ENCOUNTER — OFFICE (AMBULATORY)
Dept: URBAN - METROPOLITAN AREA LAB 2 | Facility: LAB | Age: 56
End: 2021-06-15
Payer: COMMERCIAL

## 2021-06-15 DIAGNOSIS — R19.7 DIARRHEA, UNSPECIFIED: ICD-10-CM

## 2021-06-15 PROCEDURE — 87507 IADNA-DNA/RNA PROBE TQ 12-25: CPT | Performed by: INTERNAL MEDICINE

## 2021-07-13 RX ORDER — AMLODIPINE BESYLATE 5 MG/1
TABLET ORAL
Qty: 30 TABLET | Refills: 0 | Status: SHIPPED | OUTPATIENT
Start: 2021-07-13

## 2021-08-11 ENCOUNTER — OFFICE VISIT (OUTPATIENT)
Dept: FAMILY MEDICINE CLINIC | Facility: CLINIC | Age: 56
End: 2021-08-11

## 2021-08-11 VITALS
OXYGEN SATURATION: 97 % | SYSTOLIC BLOOD PRESSURE: 152 MMHG | DIASTOLIC BLOOD PRESSURE: 90 MMHG | HEART RATE: 97 BPM | RESPIRATION RATE: 16 BRPM | BODY MASS INDEX: 20 KG/M2 | TEMPERATURE: 98.6 F | HEIGHT: 68 IN | WEIGHT: 132 LBS

## 2021-08-11 DIAGNOSIS — R01.1 SYSTOLIC CLICK-MURMUR SYNDROME: ICD-10-CM

## 2021-08-11 DIAGNOSIS — Z63.79 STRESSFUL LIFE EVENT AFFECTING FAMILY: ICD-10-CM

## 2021-08-11 DIAGNOSIS — I10 ESSENTIAL HYPERTENSION: Primary | ICD-10-CM

## 2021-08-11 DIAGNOSIS — Z12.5 PROSTATE CANCER SCREENING: ICD-10-CM

## 2021-08-11 PROBLEM — E21.0 PRIMARY HYPERPARATHYROIDISM (HCC): Status: ACTIVE | Noted: 2020-06-05

## 2021-08-11 PROCEDURE — 93000 ELECTROCARDIOGRAM COMPLETE: CPT | Performed by: NURSE PRACTITIONER

## 2021-08-11 PROCEDURE — 99214 OFFICE O/P EST MOD 30 MIN: CPT | Performed by: NURSE PRACTITIONER

## 2021-08-11 RX ORDER — HYDROCODONE BITARTRATE AND ACETAMINOPHEN 5; 325 MG/1; MG/1
TABLET ORAL
COMMUNITY
Start: 2021-08-09

## 2021-08-11 RX ORDER — AMITRIPTYLINE HYDROCHLORIDE 25 MG/1
TABLET, FILM COATED ORAL
COMMUNITY
Start: 2021-06-06 | End: 2021-08-11

## 2021-08-11 NOTE — PROGRESS NOTES
Chief Complaint  Hypertension and Stress    Subjective          Abdias Curry presents to [facility] for hypertension and stress    History of Present Illness    Patient has a splint on his right arm he reports that he injured it on Monday and has a fracture and is following up with [facility] tomorrow.  The emergency department put him in a ulnar gutter and he is not having problem with it.  He is requesting FMLA paperwork was instructed that needs to go through the hand surgeon to be done.    Patient has some stressful family situation at this point he is requesting to be off from work.  He would like to discuss that additionally with Dr. Burger who is previously been his primary care.    Abdias Curry  has a past medical history of Adenomatous polyp (9/9/2020), Anxiety, Asthma, Elevated liver enzymes, Family history of malignant neoplasm of gastrointestinal tract, Goiter, non-toxic, Hernia of anterior abdominal wall, History of rib fracture, adenomatous colonic polyps, Hyperlipidemia, Hypertension, Impotence, organic, Mitral valve insufficiency, Osteoporosis, Periorbital cellulitis, Primary hyperparathyroidism (CMS/HCC), Pulmonary nodules, Renal cyst, Ventral hernia, and Vitamin D deficiency.      Review of Systems   Constitutional: Positive for fatigue.   HENT: Negative for ear pain, sinus pain and sore throat.    Eyes: Negative for pain.   Respiratory: Positive for shortness of breath. Negative for cough.    Cardiovascular: Negative for chest pain and palpitations.   Gastrointestinal: Negative for abdominal pain, diarrhea and nausea.   Endocrine: Negative for polyuria.   Genitourinary: Negative for decreased urine volume and dysuria.   Musculoskeletal: Negative for arthralgias.        Fracture in hand   Skin: Negative for rash.   Allergic/Immunologic: Negative for environmental allergies.   Neurological: Negative for dizziness, numbness and headaches.   Hematological: Does not  "bruise/bleed easily.   Psychiatric/Behavioral: The patient is nervous/anxious.         Objective       Current Outpatient Medications:   •  amLODIPine (NORVASC) 10 MG tablet, Take 1 tablet by mouth Daily., Disp: 90 tablet, Rfl: 1  •  HYDROcodone-acetaminophen (NORCO) 5-325 MG per tablet, TAKE 1 TABLET BY MOUTH EVERY 6 HOURS AS NEEDED FOR PAIN FOR 3 DAYS, Disp: , Rfl:   •  amLODIPine (NORVASC) 5 MG tablet, Take 1 tablet by mouth once daily, Disp: 30 tablet, Rfl: 0    Vital Signs:      /90 (BP Location: Left arm, Patient Position: Sitting, Cuff Size: Adult)   Pulse 97   Temp 98.6 °F (37 °C) (Infrared)   Resp 16   Ht 172.7 cm (68\")   Wt 59.9 kg (132 lb)   SpO2 97%   BMI 20.07 kg/m²     Vitals:    08/11/21 1613   BP: 152/90   BP Location: Left arm   Patient Position: Sitting   Cuff Size: Adult   Pulse: 97   Resp: 16   Temp: 98.6 °F (37 °C)   TempSrc: Infrared   SpO2: 97%   Weight: 59.9 kg (132 lb)   Height: 172.7 cm (68\")      Physical Exam  Vitals and nursing note reviewed.   Constitutional:       Appearance: He is well-developed.   Cardiovascular:      Rate and Rhythm: Normal rate and regular rhythm.      Heart sounds: Murmur heard.   Systolic murmur is present with a grade of 2/6.   No friction rub. No gallop.       Comments: Systolic click  Pulmonary:      Effort: Pulmonary effort is normal.      Breath sounds: Normal breath sounds. No wheezing or rales.   Abdominal:      General: Bowel sounds are normal.      Palpations: Abdomen is soft.      Tenderness: There is no abdominal tenderness.   Skin:     General: Skin is warm and dry.   Neurological:      Mental Status: He is alert.        Result Review :            ECG 12 Lead    Date/Time: 8/11/2021 5:15 PM  Performed by: Lisa Cespedes APRN  Authorized by: Lisa Cespedes APRN   Comparison: not compared with previous ECG   Rhythm: sinus rhythm  Rate: normal  Conduction: conduction normal  ST Segments: ST segments normal  QRS axis: normal  Other: no " other findings    Clinical impression: normal ECG             PHQ-9 Depression Screening  Little interest or pleasure in doing things? 0   Feeling down, depressed, or hopeless? 0   Trouble falling or staying asleep, or sleeping too much?     Feeling tired or having little energy?     Poor appetite or overeating?     Feeling bad about yourself - or that you are a failure or have let yourself or your family down?     Trouble concentrating on things, such as reading the newspaper or watching television?     Moving or speaking so slowly that other people could have noticed? Or the opposite - being so fidgety or restless that you have been moving around a lot more than usual?     Thoughts that you would be better off dead, or of hurting yourself in some way?     PHQ-9 Total Score 0   If you checked off any problems, how difficult have these problems made it for you to do your work, take care of things at home, or get along with other people?             Assessment and Plan    Diagnoses and all orders for this visit:    1. Essential hypertension (Primary)  Assessment & Plan:  Hypertension is unchanged.  Continue current treatment regimen.  Blood pressure will be reassessed at the next regular appointment.    Orders:  -     Comprehensive Metabolic Panel  -     Lipid Panel    2. Stressful life event affecting family  Comments:  Appointment was arranged with patient's prior PCP to discuss.  Patient is in counseling and declined any medication    3. Systolic click-murmur syndrome  Comments:  Discuss echocardiogram to evaluate.  Patient agrees will follow up with primary tomorrow  Orders:  -     ECG 12 Lead    4. Prostate cancer screening  -     PSA Screen       Problem List Items Addressed This Visit        Active Problems    Hypertension - Primary    Current Assessment & Plan     Hypertension is unchanged.  Continue current treatment regimen.  Blood pressure will be reassessed at the next regular appointment.          Relevant Orders    Comprehensive Metabolic Panel    Lipid Panel      Other Visit Diagnoses     Stressful life event affecting family        Appointment was arranged with patient's prior PCP to discuss.  Patient is in counseling and declined any medication    Systolic click-murmur syndrome        Discuss echocardiogram to evaluate.  Patient agrees will follow up with primary tomorrow    Relevant Orders    ECG 12 Lead    Prostate cancer screening        Relevant Orders    PSA Screen          Follow Up   No follow-ups on file.  Patient was given instructions and counseling regarding his condition or for health maintenance advice. Please see specific information pulled into the AVS if appropriate.

## 2021-08-12 ENCOUNTER — OFFICE VISIT (OUTPATIENT)
Dept: FAMILY MEDICINE CLINIC | Facility: CLINIC | Age: 56
End: 2021-08-12

## 2021-08-12 VITALS
WEIGHT: 138 LBS | HEIGHT: 68 IN | HEART RATE: 74 BPM | SYSTOLIC BLOOD PRESSURE: 122 MMHG | DIASTOLIC BLOOD PRESSURE: 80 MMHG | RESPIRATION RATE: 16 BRPM | BODY MASS INDEX: 20.92 KG/M2 | TEMPERATURE: 98 F | OXYGEN SATURATION: 96 %

## 2021-08-12 DIAGNOSIS — F41.9 ANXIETY: Primary | ICD-10-CM

## 2021-08-12 PROCEDURE — FORMSMISC: Performed by: FAMILY MEDICINE

## 2021-08-12 RX ORDER — HYDROCODONE BITARTRATE AND ACETAMINOPHEN 5; 325 MG/1; MG/1
TABLET ORAL
COMMUNITY
Start: 2021-08-09 | End: 2021-08-12

## 2021-08-12 NOTE — PROGRESS NOTES
Patient has PTSD and has a significant social stressor.  He brought with him papers from his HR department at work regarding short-term disability.  He has a 10-day paul period and then should be able to start his disability.  I will complete paperwork and reevaluate him in 6 weeks.  He is involved in counseling.  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

## 2021-08-16 NOTE — TELEPHONE ENCOUNTER
Caller: Abdias Curry SHARATH    Relationship: Self    Best call back number:   Abdias Curry (Self) [phone] (H)         Medication needed:   Requested Prescriptions     Pending Prescriptions Disp Refills   • amLODIPine (NORVASC) 10 MG tablet 90 tablet 1     Sig: Take 1 tablet by mouth Daily.       When do you need the refill by:     What additional details did the patient provide when requesting the medication:     Does the patient have less than a 3 day supply:  [x] Yes  [] No    What is the patient's preferred pharmacy:      [facility], [place] - [address] - [phone] [facility] [phone]   [phone]

## 2021-08-17 ENCOUNTER — TELEPHONE (OUTPATIENT)
Dept: FAMILY MEDICINE CLINIC | Facility: CLINIC | Age: 56
End: 2021-08-17

## 2021-08-17 DIAGNOSIS — R91.1 PULMONARY NODULE: Primary | ICD-10-CM

## 2021-08-17 RX ORDER — AMLODIPINE BESYLATE 10 MG/1
10 TABLET ORAL DAILY
Qty: 90 TABLET | Refills: 1 | Status: SHIPPED | OUTPATIENT
Start: 2021-08-17 | End: 2022-04-12

## 2021-08-17 NOTE — TELEPHONE ENCOUNTER
----- Message from Molly Sherman sent at 12/18/2020  9:52 AM EST -----    ----- Message -----  From: Molly Sherman  Sent: 8/3/2020   5:26 PM EST  To: Anderson Panchal [facility]    Patient due for 1 year CT to follow up on lung nodules from 07/31/2020.

## 2021-08-27 ENCOUNTER — HOSPITAL ENCOUNTER (OUTPATIENT)
Dept: CT IMAGING | Facility: HOSPITAL | Age: 56
Discharge: HOME | End: 2021-08-27
Admitting: FAMILY MEDICINE

## 2021-08-27 DIAGNOSIS — R91.1 PULMONARY NODULE: ICD-10-CM

## 2021-08-27 PROCEDURE — 71250 CT THORAX DX C-: CPT

## 2021-09-16 LAB
ALBUMIN SERPL-MCNC: 4.6 G/DL (ref 3.8–4.9)
ALBUMIN/GLOB SERPL: 1.6 {RATIO} (ref 1.2–2.2)
ALP SERPL-CCNC: 121 IU/L (ref 44–121)
ALT SERPL-CCNC: 40 IU/L (ref 0–44)
AST SERPL-CCNC: 80 IU/L (ref 0–40)
BILIRUB SERPL-MCNC: 0.9 MG/DL (ref 0–1.2)
BUN SERPL-MCNC: 4 MG/DL (ref 6–24)
BUN/CREAT SERPL: 6 (ref 9–20)
CALCIUM SERPL-MCNC: 10.8 MG/DL (ref 8.7–10.2)
CHLORIDE SERPL-SCNC: 93 MMOL/L (ref 96–106)
CHOLEST SERPL-MCNC: 266 MG/DL (ref 100–199)
CO2 SERPL-SCNC: 25 MMOL/L (ref 20–29)
CREAT SERPL-MCNC: 0.65 MG/DL (ref 0.76–1.27)
GLOBULIN SER CALC-MCNC: 2.9 G/DL (ref 1.5–4.5)
GLUCOSE SERPL-MCNC: 92 MG/DL (ref 65–99)
HDLC SERPL-MCNC: 160 MG/DL
LDLC SERPL CALC-MCNC: 94 MG/DL (ref 0–99)
POTASSIUM SERPL-SCNC: 3.7 MMOL/L (ref 3.5–5.2)
PROT SERPL-MCNC: 7.5 G/DL (ref 6–8.5)
PSA SERPL-MCNC: 0.9 NG/ML (ref 0–4)
SODIUM SERPL-SCNC: 136 MMOL/L (ref 134–144)
TRIGL SERPL-MCNC: 76 MG/DL (ref 0–149)
VLDLC SERPL CALC-MCNC: 12 MG/DL (ref 5–40)

## 2022-04-12 RX ORDER — AMLODIPINE BESYLATE 10 MG/1
TABLET ORAL
Qty: 90 TABLET | Refills: 0 | Status: SHIPPED | OUTPATIENT
Start: 2022-04-12

## 2022-06-30 RX ORDER — AMLODIPINE BESYLATE 10 MG/1
TABLET ORAL
Qty: 90 TABLET | Refills: 0 | OUTPATIENT
Start: 2022-06-30

## 2022-08-04 ENCOUNTER — HOSPITAL ENCOUNTER (OUTPATIENT)
Dept: GENERAL RADIOLOGY | Facility: HOSPITAL | Age: 57
Discharge: HOME | End: 2022-08-04

## 2022-08-04 ENCOUNTER — TRANSCRIBE ORDERS (OUTPATIENT)
Dept: ADMINISTRATIVE | Facility: HOSPITAL | Age: 57
End: 2022-08-04

## 2022-08-04 DIAGNOSIS — Z02.71 ENCOUNTER FOR DISABILITY DETERMINATION: ICD-10-CM

## 2022-08-04 DIAGNOSIS — Z02.71 ENCOUNTER FOR DISABILITY DETERMINATION: Primary | ICD-10-CM

## 2022-08-04 PROCEDURE — 73560 X-RAY EXAM OF KNEE 1 OR 2: CPT

## 2022-08-04 PROCEDURE — 72100 X-RAY EXAM L-S SPINE 2/3 VWS: CPT

## 2022-08-04 PROCEDURE — 73600 X-RAY EXAM OF ANKLE: CPT

## 2022-09-28 RX ORDER — AMLODIPINE BESYLATE 10 MG/1
TABLET ORAL
Qty: 90 TABLET | Refills: 0 | OUTPATIENT
Start: 2022-09-28

## 2022-10-31 RX ORDER — AMLODIPINE BESYLATE 10 MG/1
TABLET ORAL
Qty: 90 TABLET | Refills: 0 | OUTPATIENT
Start: 2022-10-31

## 2022-11-24 NOTE — ANESTHESIA PREPROCEDURE EVALUATION
Anesthesia Evaluation     NPO Solid Status: > 8 hours  NPO Liquid Status: > 8 hours           Airway   Mallampati: II  TM distance: >3 FB  No difficulty expected  Dental      Pulmonary    (+) asthma,  Cardiovascular     (+) hypertension, valvular problems/murmurs, hyperlipidemia,       Neuro/Psych  GI/Hepatic/Renal/Endo    (+)   renal disease,     Musculoskeletal     (+) back pain,   Abdominal    Substance History      OB/GYN          Other                        Anesthesia Plan    ASA 3     MAC     intravenous induction     Anesthetic plan, all risks, benefits, and alternatives have been provided, discussed and informed consent has been obtained with: patient.      
L AC/yes

## (undated) DEVICE — BITEBLOCK ENDO W/STRAP 60F A/ LF DISP

## (undated) DEVICE — SINGLE-USE BIOPSY FORCEPS: Brand: RADIAL JAW 4

## (undated) DEVICE — PAPR PRNT PK SONY W RIBN UPC55

## (undated) DEVICE — PK ENDO GI 50